# Patient Record
(demographics unavailable — no encounter records)

---

## 2025-07-10 NOTE — PHYSICAL EXAM
[de-identified] : General appearance: Patient presents with a morbidly obese body habitus, well nourished and hydrated, pleasant, alert and oriented x 3, cooperative.   HEENT: normocephalic, EOM intact, wearing mask, external auditory canal clear.   Cardiovascular: no lower leg edema, no varicosities, dorsalis pedis pulses palpable and symmetric.   Lymphatics: no palpable lymphadenopathy, no lymphedema.   Neurologic: sensation is normal, no muscle weakness in upper or lower extremities, patella tendon reflexes present and symmetric.   Dermatologic: skin moist, warm, no rash.   Spine: cervical spine with normal lordosis and painless range of motion, thoracic spine with normal kyphosis and painless range of motion, lumbosacral spine with normal lordosis and painless range of motion.  Gait:  He's presenting with no assistive device. He transition cautiously from seated to standing and demostrates a cautious gait pattern. He does demostrates a right valgus thrust.  Left knee:  - Focal soft tissue swelling: none - Baker cyst: No palpable Baker's cysts - Ecchymosis: none - Erythema: none - Effusion: trace - Wounds: Well healed midline incision, benign appearing. - Alignment: normal - Tenderness: none - ROM: 3 degrees of hyperextension to 103. - Collateral laxity: Demostrates mildly increased laxity to varus, with a soft endpoint. - Cruciate laxity: stable - Popliteal angle (degrees): 35 - Quad strength: 5/5 - Extensor la  Right knee: - Focal soft tissue swelling: none - Baker cyst: No palpable Baker's cysts - Ecchymosis: none - Erythema: none - Effusion: small - Wounds: none - Alignment: valgus - Tenderness: Medial and lateral joint line tenderness to palpation. He has pain but no crepitus with patellar compression test. - ROM: 0-105 - Collateral laxity: stable;  the valgus deformity is only minimally correctable. - Cruciate laxity: stable - Popliteal angle (degrees): 35 - Quad strength: 4+/5 - Extensor lag: 15 [de-identified] : AP pelvis and 4 views of the bilateral knees (weightbearing AP, weightbearing Murphy, weightbearing lateral, and Sunrise) were interpreted by me and reviewed with the patient.  Location of imaging:  [facility] Date of exam: 07/09/2025  Pelvic alignment: Slight outlet  Right hip --  Arthritis: none Deformity: none Osteonecrosis: none  Left hip --  Arthritis: none Deformity: none Osteonecrosis: none  Right knee --  Alignment: valgus Arthritis: Tricompartmental osteoarthritis most pronounced laterally, K&L 3-4. Patellar height: diane Patellar tracking: centrally  Left knee --  Demonstrates a total knee replacement in position, which appears to be a cementless PS triathlon, with resurfaced patella, all components appear to be well-fixed in reasonable alignment without evidence of mechanical complications. Patella sits at normal height and tracks centrally.

## 2025-07-10 NOTE — HISTORY OF PRESENT ILLNESS
[Pain Location] : pain [] : right knee [5] : a current pain level of 5/10 [de-identified] : 07/09/2025: 41 year old male presents today for right knee pain. He said he's had this pain since 2007 and it's been progressive since then. He takes ibuprofen as needed. He denies any trauma to the right knee or prior right knee surgeries. He did have hyaluronic acid injections in November of 2024, which provided him with only a couple of weeks of relief. He denies any prior right knee corticosteroid injections. He does ambulate independently. He works as a ; and he has five flights of stairs at home. He is morbidly obese with a BMI of approximately 48 today, but he notes that he's lost about a hundred pounds over the past two years, and his wife has lost 200lbs after a gastric sleeve procedure. He's been on Ozempic for the past 4 months and lost 40lbs in that time. His pain is a 10/10 at its worst, and he can only walk about 5 blocks before he needs to take a break. The pain is predominantly on the lateral aspect of the knee but does radiate a little bit anteriorly on the tibia. He feels that he has exhausted all conservative treatment options, and given his satisfaction with his left total knee, which was done in 2017 with Dr. Oscar Liang, he is interested in pursuing a right total knee.   Patient reports history of diabetes. He believes that the last time that he had blood work taken to check his diabetes was about 7 months ago. He does not recall exactly what his A1C was at that time, but he reports that it was "a little high".  [de-identified] : shooting, sharp, burning, sometimes cramping [de-identified] : walking, incline, and standing after sitting for too long [de-identified] : rest, medication, laying down

## 2025-07-10 NOTE — HISTORY OF PRESENT ILLNESS
[Pain Location] : pain [] : right knee [5] : a current pain level of 5/10 [de-identified] : 07/09/2025: 41 year old male presents today for right knee pain. He said he's had this pain since 2007 and it's been progressive since then. He takes ibuprofen as needed. He denies any trauma to the right knee or prior right knee surgeries. He did have hyaluronic acid injections in November of 2024, which provided him with only a couple of weeks of relief. He denies any prior right knee corticosteroid injections. He does ambulate independently. He works as a ; and he has five flights of stairs at home. He is morbidly obese with a BMI of approximately 48 today, but he notes that he's lost about a hundred pounds over the past two years, and his wife has lost 200lbs after a gastric sleeve procedure. He's been on Ozempic for the past 4 months and lost 40lbs in that time. His pain is a 10/10 at its worst, and he can only walk about 5 blocks before he needs to take a break. The pain is predominantly on the lateral aspect of the knee but does radiate a little bit anteriorly on the tibia. He feels that he has exhausted all conservative treatment options, and given his satisfaction with his left total knee, which was done in 2017 with Dr. Oscar Liang, he is interested in pursuing a right total knee.   Patient reports history of diabetes. He believes that the last time that he had blood work taken to check his diabetes was about 7 months ago. He does not recall exactly what his A1C was at that time, but he reports that it was "a little high".  [de-identified] : shooting, sharp, burning, sometimes cramping [de-identified] : walking, incline, and standing after sitting for too long [de-identified] : rest, medication, laying down

## 2025-07-10 NOTE — PHYSICAL EXAM
[de-identified] : General appearance: Patient presents with a morbidly obese body habitus, well nourished and hydrated, pleasant, alert and oriented x 3, cooperative.   HEENT: normocephalic, EOM intact, wearing mask, external auditory canal clear.   Cardiovascular: no lower leg edema, no varicosities, dorsalis pedis pulses palpable and symmetric.   Lymphatics: no palpable lymphadenopathy, no lymphedema.   Neurologic: sensation is normal, no muscle weakness in upper or lower extremities, patella tendon reflexes present and symmetric.   Dermatologic: skin moist, warm, no rash.   Spine: cervical spine with normal lordosis and painless range of motion, thoracic spine with normal kyphosis and painless range of motion, lumbosacral spine with normal lordosis and painless range of motion.  Gait:  He's presenting with no assistive device. He transition cautiously from seated to standing and demostrates a cautious gait pattern. He does demostrates a right valgus thrust.  Left knee:  - Focal soft tissue swelling: none - Baker cyst: No palpable Baker's cysts - Ecchymosis: none - Erythema: none - Effusion: trace - Wounds: Well healed midline incision, benign appearing. - Alignment: normal - Tenderness: none - ROM: 3 degrees of hyperextension to 103. - Collateral laxity: Demostrates mildly increased laxity to varus, with a soft endpoint. - Cruciate laxity: stable - Popliteal angle (degrees): 35 - Quad strength: 5/5 - Extensor la  Right knee: - Focal soft tissue swelling: none - Baker cyst: No palpable Baker's cysts - Ecchymosis: none - Erythema: none - Effusion: small - Wounds: none - Alignment: valgus - Tenderness: Medial and lateral joint line tenderness to palpation. He has pain but no crepitus with patellar compression test. - ROM: 0-105 - Collateral laxity: stable;  the valgus deformity is only minimally correctable. - Cruciate laxity: stable - Popliteal angle (degrees): 35 - Quad strength: 4+/5 - Extensor lag: 15 [de-identified] : AP pelvis and 4 views of the bilateral knees (weightbearing AP, weightbearing Murphy, weightbearing lateral, and Sunrise) were interpreted by me and reviewed with the patient.  Location of imaging:  [facility] Date of exam: 07/09/2025  Pelvic alignment: Slight outlet  Right hip --  Arthritis: none Deformity: none Osteonecrosis: none  Left hip --  Arthritis: none Deformity: none Osteonecrosis: none  Right knee --  Alignment: valgus Arthritis: Tricompartmental osteoarthritis most pronounced laterally, K&L 3-4. Patellar height: diane Patellar tracking: centrally  Left knee --  Demonstrates a total knee replacement in position, which appears to be a cementless PS triathlon, with resurfaced patella, all components appear to be well-fixed in reasonable alignment without evidence of mechanical complications. Patella sits at normal height and tracks centrally.

## 2025-07-10 NOTE — DISCUSSION/SUMMARY
[de-identified] : IMP: 41 year old male with well-functioning left total knee replacement and severe right knee osteoarthritis, in the setting of morbid obesity and questionably controlled diabetes. - He will require a right total knee replacement to resolve his right knee symptoms, but is not currently a candidate for surgery considering his morbid obesity.  - He's made very good progress with weight loss in the last several months, and I encourage him to continue with diet and Ozempic to continue lowering his weight.  - We reviewed that my target weight for him would be about 300 pounds, to reach a BMI of 40, which I think that he could potentially achieve by the end of this calendar year.  - For the moment, I recommended that we continue with non-surgical treatment, including physical therapy, home exercise, as well as Tylenol and Ibuprofen, which he is currently taking and tolerating.  - We will obtain serum A1C and fructosamine levels today to assess his level of diabetic control.  - If they're within acceptable range, we'll bring him back shortly for a right knee cortisone injection for some more short-term pain relief.  - But otherwise, we'll plan to follow up in another 3 months with no new x-rays needed. He should be reweighed upon every subsequent follow-up.

## 2025-07-10 NOTE — END OF VISIT
[FreeTextEntry3] : Documented by Chanel Leone acting as a scribe for Dr. Stuart Woods. 07/09/2025  All medical record entries made by the Scribe were at my, Dr. Stuart Woods, direction and personally dictated by me on 07/09/2025. I have reviewed the chart and agree that the record accurately reflects my personal performance of the history, physical exam, assessment and plan. I have also personally directed, reviewed, and agreed with the chart.

## 2025-07-16 NOTE — PROCEDURE
[de-identified] :  Procedure: Knee joint injection Laterality:  RIGHT Indication: Osteoarthritis - discussed with patient Skin prep: alcohol and chlorhexidine Anesthesia: ethyl chloride spray Needle: 20-gauge Portal: inferolateral Aspiration: none Injectate: 2cc of 2% lidocaine, 2cc of 0.5% bupivacaine, and 1cc of 40mg/mL triamcinolone Dressing: Band-aid Complications: None  -RTC in 3 months to repeat right knee CSI PRN - Referral to sports for right elbow pain x 2 months playing basketball